# Patient Record
Sex: MALE
[De-identification: names, ages, dates, MRNs, and addresses within clinical notes are randomized per-mention and may not be internally consistent; named-entity substitution may affect disease eponyms.]

---

## 2018-10-16 ENCOUNTER — HOSPITAL ENCOUNTER (EMERGENCY)
Dept: HOSPITAL 25 - UCEAST | Age: 28
Discharge: HOME | End: 2018-10-16
Payer: SELF-PAY

## 2018-10-16 VITALS — SYSTOLIC BLOOD PRESSURE: 118 MMHG | DIASTOLIC BLOOD PRESSURE: 78 MMHG

## 2018-10-16 DIAGNOSIS — Z72.0: ICD-10-CM

## 2018-10-16 DIAGNOSIS — S90.32XA: Primary | ICD-10-CM
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
## 2018-10-16 PROCEDURE — G0463 HOSPITAL OUTPT CLINIC VISIT: HCPCS

## 2018-10-16 PROCEDURE — 99202 OFFICE O/P NEW SF 15 MIN: CPT

## 2018-10-16 NOTE — ED
Lower Extremity





- HPI Summary


HPI Summary: 


Pt here w/ Lt foot pain, bruising and swelling since injury at work yesterday - 

accidentally dropped a metal madison on top of foot. Denies numbness, tingling, 

weakness and has been ambulating - tender with foot strike and push off but 

otherwise no pain. Moving toes and ankle well. Iced his foot last night. No 

meds.





- History of Current Complaint


Chief Complaint: UCLowerExtremity


Stated Complaint: L FOOT INJURY


Time Seen by Provider: 10/16/18 11:37


Hx Obtained From: Patient


Pain Intensity: 4





- Allergies/Home Medications


Allergies/Adverse Reactions: 


 Allergies











Allergy/AdvReac Type Severity Reaction Status Date / Time


 


No Known Allergies Allergy   Verified 10/16/18 11:28











Home Medications: 


 Home Medications





NK [No Home Medications Reported]  10/16/18 [History Confirmed 10/16/18]











PMH/Surg Hx/FS Hx/Imm Hx


Previously Healthy: Yes


Endocrine/Hematology History: 


   Denies: Hx Anticoagulant Therapy, Hx Blood Disorders


Infectious Disease History: No


Infectious Disease History: 


   Denies: Traveled Outside the [place] in Last 30 Days





- Social History


Occupation: Employed Full-time - works in a lab


Alcohol Use: None


Hx Substance Use: No


Substance Use Type: Reports: None


Hx Tobacco Use: Yes


Smoking Status (MU): Current Some Day Smoker


Amount Used/How Often: a couple of cig/ week





Review of Systems


Positive: no symptoms reported


Positive: Arthralgia, Edema.  Negative: Decreased ROM


Positive: Bruising


Neurological: Negative


Psychological: Normal


All Other Systems Reviewed And Are Negative: Yes





Physical Exam


Triage Information Reviewed: Yes


Vital Signs On Initial Exam: 


 Initial Vitals











Temp Pulse Resp BP Pulse Ox


 


 98.8 F   68   16   118/78   100 


 


 10/16/18 11:29  10/16/18 11:29  10/16/18 11:29  10/16/18 11:29  10/16/18 11:29











Vital Signs Reviewed: Yes


Appearance: Positive: Well-Appearing, No Pain Distress, Thin


Skin: Positive: Warm, Skin Color Reflects Adequate Perfusion, Dry - healing 

ecchymosis over Lt dorsal foot - no skin breakdown


Head/Face: Positive: Normal Head/Face Inspection


Eyes: Positive: EOMI


ENT: Positive: Hearing grossly normal


Cardiovascular: Positive: Pulses are Symmetrical in both Upper and Lower 

Extremities


Musculoskeletal: Positive: Strength/ROM Intact, Pain @ - TTP over ecchymosis w/ 

mild edema of Lt dorsal foot - no gross deformity -  other MT's, ankle and toes 

are NTTP


Neurological: Positive: Normal, Sensory/Motor Intact, Alert, Oriented to Person 

Place, Time, CN Intact II-III


Psychiatric: Positive: Normal





Diagnostics





- Vital Signs


 Vital Signs











  Temp Pulse Resp BP Pulse Ox


 


 10/16/18 11:29  98.8 F  68  16  118/78  100














- Laboratory


Lab Statement: Any lab studies that have been ordered have been reviewed, and 

results considered in the medical decision making process.





Lower Extremity Course/Dx





- Course


Course Of Treatment: Xr: No fx, no dislocation





- Diagnoses


Provider Diagnoses: 


 Contusion of left foot








Discharge





- Sign-Out/Discharge


Documenting (check all that apply): Patient Departure


All imaging exams completed and their final reports reviewed: Yes





- Discharge Plan


Condition: Stable


Disposition: HOME


Patient Education Materials:  Foot Contusion (ED)


Forms:  *Work Release


Referrals: 


Lorenzo Stone MD [Medical Doctor] - 


Additional Instructions: 


REST, ICE, ELEVATE AND YOU MAY WRAP WITH AN ACE WRAP FOR COMFORT. Follow-up 

with occupational medicine if pain persists or worsens - phone number included 

here today. 





You may take ibuprofen alternating with acetaminophen as needed for pain. You 

may also try topical Arnica cream to aid in healing of bruising. These 

medications may be purchased over the counter.





*If you develop numbness, tingling, weakness, swelling or skin discoloration, 

remove ACE wrap and elevate leg for 20 minutes. If symptoms persist, go to the 

ED





- Billing Disposition and Condition


Condition: STABLE


Disposition: Home

## 2018-10-16 NOTE — RAD
HISTORY: Dropped heavy metal madison on dorsum of foot



COMPARISONS: None



VIEWS: 3 , Frontal, lateral, and oblique views of the left foot 



FINDINGS:



BONE DENSITY: Normal.

BONES: There is no displaced fracture.

JOINTS: There is no arthropathy.

ALIGNMENT: There is no dislocation. 

SOFT TISSUES: Unremarkable.



OTHER FINDINGS: None.



IMPRESSION: 

NO ACUTE OSSEOUS INJURY. IF SYMPTOMS PERSIST, RECOMMEND REPEAT IMAGING.